# Patient Record
Sex: FEMALE | Race: WHITE | NOT HISPANIC OR LATINO
[De-identification: names, ages, dates, MRNs, and addresses within clinical notes are randomized per-mention and may not be internally consistent; named-entity substitution may affect disease eponyms.]

---

## 2023-05-16 PROBLEM — Z00.00 ENCOUNTER FOR PREVENTIVE HEALTH EXAMINATION: Status: ACTIVE | Noted: 2023-05-16

## 2023-05-23 ENCOUNTER — APPOINTMENT (OUTPATIENT)
Dept: UROLOGY | Facility: CLINIC | Age: 53
End: 2023-05-23
Payer: MEDICAID

## 2023-05-23 VITALS
WEIGHT: 180 LBS | RESPIRATION RATE: 17 BRPM | BODY MASS INDEX: 31.89 KG/M2 | TEMPERATURE: 98.4 F | HEIGHT: 63 IN | SYSTOLIC BLOOD PRESSURE: 108 MMHG | HEART RATE: 93 BPM | DIASTOLIC BLOOD PRESSURE: 72 MMHG

## 2023-05-23 PROCEDURE — 99203 OFFICE O/P NEW LOW 30 MIN: CPT

## 2023-05-23 NOTE — HISTORY OF PRESENT ILLNESS
[FreeTextEntry1] : Patient is a 52 year old female who was found to have renal mass in the setting of work up for GI upset. She never had gross hematuria but not sure because she was having her period.\par \par \par PMH : None \par PSH : Cyst removed from breath as a teenager\par FH : Father had Multiple myeloma \par Social : Here with her  , has six children

## 2023-05-23 NOTE — PHYSICAL EXAM
[General Appearance - Well Developed] : well developed [General Appearance - Well Nourished] : well nourished [] : no respiratory distress [Normal Station and Gait] : the gait and station were normal for the patient's age [No Focal Deficits] : no focal deficits [Oriented To Time, Place, And Person] : oriented to person, place, and time [Affect] : the affect was normal [Not Anxious] : not anxious

## 2023-05-23 NOTE — REVIEW OF SYSTEMS
[Fever] : fever [Chills] : chills [Feeling Tired] : feeling tired [Sore Throat] : sore throat [Vomiting] : vomiting [Constipation] : constipation [Diarrhea] : diarrhea [Heartburn] : heartburn [see HPI] : see HPI [Wake up at night to urinate  How many times?  ___] : wakes up to urinate [unfilled] times during the night [Strong urge to urinate] : strong urge to urinate [Swollen Glands] : swollen glands [Negative] : Endocrine

## 2023-05-23 NOTE — ASSESSMENT
[FreeTextEntry1] : Patient is a 52 year old female presents for evaluation of right renal mass incidentally found during work up of GI upset. \par \par renal mass\par We reviewed the images and reports . We reviewed the possibe underlying histology of solid enhancing renal masses . We discussed the risk of malignancy  vs benign . We discussed the possibility/ role of percutaneous biopsy ,with the associated risks, benefits , complications and accuracy issues ( risk of false negative). \par The natural history and biology of renal cell carcinoma was discussed. \par Options were reviewed including ,not limited to,active surveillance, surgical extirpation and ablation . The risk of tumor growth and metastasis on active surveillance were reviewed .\par Options were reviewed including , not limited to,active surveillance ( AS ) surgical extirpation and ablation. The risk of tumor growth and metastasis on AS could mean missing the opportunity for cure was discussed \par With respect to treatment we reviewed  ablation ( cryoablation, radiofrequency ablation) risks of recurrence,opportunities for salvage treatment and imaging requirements for follow up based on he pathological findings . Oncologic outcomes for ablation were reviewed .\par With respect for surgery we reviewed nephron sparing surgery vs radical nephrectomy  as well as minimally invasive laparoscopic  approach surgery and the possibility of converting to an open procedure . \par Risks of surgical complications were reviewed, including but not limited to : bleeding/ life threatening hemorrhage, vascular/bowel/adjacent visceral organ injury,trocar access injury, the possibility of recognized vs unrecognized delayed recognition injury, risks of thermal /blunt/sharp/retraction injury. We reviewed the risk of DVT, PE, MI ,death,risks of cardiopulmonary/anesthesia related complications,positional injury,infection/collection/abscess,wound complications/dehiscence,urinoma/fistula, ureteral injury/obstruction as well as other complications \par Written materials regarding preop clear liquid diet and bowel prep were given to patient . \par Booking form entered\par

## 2023-06-15 ENCOUNTER — OUTPATIENT (OUTPATIENT)
Dept: OUTPATIENT SERVICES | Facility: HOSPITAL | Age: 53
LOS: 1 days | End: 2023-06-15

## 2023-06-15 VITALS
TEMPERATURE: 98 F | HEIGHT: 63 IN | SYSTOLIC BLOOD PRESSURE: 98 MMHG | HEART RATE: 82 BPM | RESPIRATION RATE: 14 BRPM | WEIGHT: 179.9 LBS | OXYGEN SATURATION: 99 % | DIASTOLIC BLOOD PRESSURE: 65 MMHG

## 2023-06-15 DIAGNOSIS — N28.89 OTHER SPECIFIED DISORDERS OF KIDNEY AND URETER: ICD-10-CM

## 2023-06-15 DIAGNOSIS — N60.02 SOLITARY CYST OF LEFT BREAST: Chronic | ICD-10-CM

## 2023-06-15 LAB
ALBUMIN SERPL ELPH-MCNC: 3.9 G/DL — SIGNIFICANT CHANGE UP (ref 3.3–5)
ALP SERPL-CCNC: 54 U/L — SIGNIFICANT CHANGE UP (ref 40–120)
ALT FLD-CCNC: 11 U/L — SIGNIFICANT CHANGE UP (ref 4–33)
ANION GAP SERPL CALC-SCNC: 14 MMOL/L — SIGNIFICANT CHANGE UP (ref 7–14)
AST SERPL-CCNC: 16 U/L — SIGNIFICANT CHANGE UP (ref 4–32)
BILIRUB SERPL-MCNC: <0.2 MG/DL — SIGNIFICANT CHANGE UP (ref 0.2–1.2)
BLD GP AB SCN SERPL QL: NEGATIVE — SIGNIFICANT CHANGE UP
BUN SERPL-MCNC: 9 MG/DL — SIGNIFICANT CHANGE UP (ref 7–23)
CALCIUM SERPL-MCNC: 9 MG/DL — SIGNIFICANT CHANGE UP (ref 8.4–10.5)
CHLORIDE SERPL-SCNC: 102 MMOL/L — SIGNIFICANT CHANGE UP (ref 98–107)
CO2 SERPL-SCNC: 22 MMOL/L — SIGNIFICANT CHANGE UP (ref 22–31)
CREAT SERPL-MCNC: 0.67 MG/DL — SIGNIFICANT CHANGE UP (ref 0.5–1.3)
EGFR: 106 ML/MIN/1.73M2 — SIGNIFICANT CHANGE UP
GLUCOSE SERPL-MCNC: 101 MG/DL — HIGH (ref 70–99)
HCG UR QL: NEGATIVE — SIGNIFICANT CHANGE UP
HCT VFR BLD CALC: 38.3 % — SIGNIFICANT CHANGE UP (ref 34.5–45)
HGB BLD-MCNC: 12.6 G/DL — SIGNIFICANT CHANGE UP (ref 11.5–15.5)
MCHC RBC-ENTMCNC: 28.3 PG — SIGNIFICANT CHANGE UP (ref 27–34)
MCHC RBC-ENTMCNC: 32.9 GM/DL — SIGNIFICANT CHANGE UP (ref 32–36)
MCV RBC AUTO: 86.1 FL — SIGNIFICANT CHANGE UP (ref 80–100)
NRBC # BLD: 0 /100 WBCS — SIGNIFICANT CHANGE UP (ref 0–0)
NRBC # FLD: 0 K/UL — SIGNIFICANT CHANGE UP (ref 0–0)
PLATELET # BLD AUTO: 425 K/UL — HIGH (ref 150–400)
POTASSIUM SERPL-MCNC: 3.6 MMOL/L — SIGNIFICANT CHANGE UP (ref 3.5–5.3)
POTASSIUM SERPL-SCNC: 3.6 MMOL/L — SIGNIFICANT CHANGE UP (ref 3.5–5.3)
PROT SERPL-MCNC: 7.1 G/DL — SIGNIFICANT CHANGE UP (ref 6–8.3)
RBC # BLD: 4.45 M/UL — SIGNIFICANT CHANGE UP (ref 3.8–5.2)
RBC # FLD: 14 % — SIGNIFICANT CHANGE UP (ref 10.3–14.5)
RH IG SCN BLD-IMP: POSITIVE — SIGNIFICANT CHANGE UP
SODIUM SERPL-SCNC: 138 MMOL/L — SIGNIFICANT CHANGE UP (ref 135–145)
WBC # BLD: 8.57 K/UL — SIGNIFICANT CHANGE UP (ref 3.8–10.5)
WBC # FLD AUTO: 8.57 K/UL — SIGNIFICANT CHANGE UP (ref 3.8–10.5)

## 2023-06-15 RX ORDER — SODIUM CHLORIDE 9 MG/ML
1000 INJECTION, SOLUTION INTRAVENOUS
Refills: 0 | Status: DISCONTINUED | OUTPATIENT
Start: 2023-06-26 | End: 2023-06-27

## 2023-06-15 NOTE — H&P PST ADULT - NSICDXPASTMEDICALHX_GEN_ALL_CORE_FT
PAST MEDICAL HISTORY:  History of hepatitis A     Other specified disorders of kidney and ureter

## 2023-06-15 NOTE — H&P PST ADULT - GASTROINTESTINAL
soft/nontender/normal active bowel sounds/no rigidity/no organomegaly/no palpable melisa/no masses palpable negative details…

## 2023-06-15 NOTE — H&P PST ADULT - HISTORY OF PRESENT ILLNESS
51y/o female scheduled for laparoscopic right partial nephrectomy possible radical, possible open on 6/26/2023.  Pt states, " 53y/o female scheduled for laparoscopic right partial nephrectomy possible radical, possible open on 6/26/2023.  Pt states, "developed abdominal pain, n/v approximately 1 months ago, went to the ER.  Underwent cat scan incidental finding of right kidney mass.  Abdominal pain resolved in 2 days."

## 2023-06-15 NOTE — H&P PST ADULT - PROBLEM SELECTOR PLAN 1
Pt scheduled for laparoscopic right partila Pt scheduled for laparoscopic right partial nephrectomy, possible radical, possible open on 6/26/2023.  labs done results pending, ekg done.  Urine cup provided.  Chlorhexidine provided-  written and verbal instructions given, pt able to verbalize understanding.  Preop teaching done pt able to verbalize understanding.    medication day of procedure- sprintec, pepcid

## 2023-06-15 NOTE — H&P PST ADULT - HISTORY OF COVID-19 VACCINATION
History  Chief Complaint   Patient presents with   • Vertigo - Recurrent     Pt reports vertigo since last Thursday, severe headaches, dizziness, watery eyes; no relief with meclizine and valium       History provided by:  Patient   used: No      60-year-old female presents emergency department for evaluation of headache, vertigo  She has an MRI from 2021 that showed empty sella syndrome and she was told that she may have elevated intracranial pressure in the future  She has no blurry vision  She has no nausea  She has no numbness, weakness or tingling in her extremities  She has no symptoms when at rest, dizziness when she turns her head to the side  She has watery eyes, congestion  Symptoms alleviated when at rest   Headache is 5/10 this time and has been present for several days with no thunderclap features  No fever  No chest pain, shortness of breath, abdominal pain  Prior to Admission Medications   Prescriptions Last Dose Informant Patient Reported? Taking? ASPIRIN 81 PO   Yes No   Sig: Take 81 mg by mouth   meclizine (ANTIVERT) 12 5 MG tablet   No No   Sig: Take 1 tablet (12 5 mg total) by mouth 3 (three) times a day as needed for dizziness for up to 4 days   meclizine (ANTIVERT) 25 mg tablet   Yes No   Sig: Take 25 mg by mouth 3 (three) times a day as needed      Facility-Administered Medications: None       Past Medical History:   Diagnosis Date   • Colitis    • Diverticulitis    • Dizziness    • HL (hearing loss)    • Stroke [facility])     unknown origin of 3 "mini strokes"  2008, 2012, [address] [facility]    • Tinnitus        Past Surgical History:   Procedure Laterality Date   • ADENOIDECTOMY     • CHOLECYSTECTOMY     • COLONOSCOPY     • DILATION AND CURETTAGE OF UTERUS     • ID COLONOSCOPY FLX DX W/COLLJ SPEC WHEN PFRMD N/A 4/16/2018    Procedure: COLONOSCOPY;  Surgeon: Álvaro Ledezma MD;  Location: [facility]  GI [facility];   Service: Gastroenterology   • TONSILLECTOMY Family History   Problem Relation Age of Onset   • Prostate cancer Father 79   • No Known Problems Sister    • No Known Problems Sister    • No Known Problems Daughter    • No Known Problems Daughter    • No Known Problems Maternal Grandmother    • No Known Problems Maternal Grandfather    • No Known Problems Paternal Grandmother    • No Known Problems Paternal Grandfather    • No Known Problems Maternal Aunt    • Breast cancer Paternal Aunt 53   • Colon cancer Paternal [de-identified]   • BRCA1 Positive Cousin    • BRCA1 Positive Cousin    • Colon cancer Paternal Uncle         colon ca     I have reviewed and agree with the history as documented  E-Cigarette/Vaping   • E-Cigarette Use Never User      E-Cigarette/Vaping Substances     Social History     Tobacco Use   • Smoking status: Former   • Smokeless tobacco: Never   Vaping Use   • Vaping Use: Never used   Substance Use Topics   • Alcohol use: No   • Drug use: No       Review of Systems   Neurological: Positive for dizziness  All other systems reviewed and are negative  Physical Exam  Physical Exam  Vitals and nursing note reviewed  Constitutional:       General: She is not in acute distress  Appearance: She is well-developed and well-nourished  HENT:      Head: Normocephalic and atraumatic  Eyes:      Extraocular Movements: EOM normal       Pupils: Pupils are equal, round, and reactive to light  Cardiovascular:      Rate and Rhythm: Normal rate and regular rhythm  Heart sounds: Normal heart sounds  No murmur heard  Pulmonary:      Effort: Pulmonary effort is normal  No respiratory distress  Breath sounds: Normal breath sounds  No wheezing or rales  Abdominal:      General: Bowel sounds are normal  There is no distension  Palpations: Abdomen is soft  Tenderness: There is no abdominal tenderness  There is no guarding or rebound  Musculoskeletal:         General: No deformity or edema  Normal range of motion  Cervical back: Normal range of motion and neck supple  Lymphadenopathy:      Cervical: No cervical adenopathy  Skin:     Capillary Refill: Capillary refill takes less than 2 seconds  Findings: No erythema or rash  Neurological:      Mental Status: She is alert and oriented to person, place, and time  Cranial Nerves: No cranial nerve deficit  Motor: No abnormal muscle tone  Coordination: Coordination normal       Comments: PERRL  No numbness, weakness in her extremities  Able to ambulate  Psychiatric:         Mood and Affect: Mood and affect normal          Behavior: Behavior normal          Vital Signs  ED Triage Vitals [11/18/22 1328]   Temperature Pulse Respirations Blood Pressure SpO2   98 4 °F (36 9 °C) 98 18 (!) 176/83 95 %      Temp Source Heart Rate Source Patient Position - Orthostatic VS BP Location FiO2 (%)   Oral Monitor Sitting Right arm --      Pain Score       No Pain           Vitals:    11/18/22 1328 11/18/22 1554   BP: (!) 176/83 139/71   Pulse: 98 82   Patient Position - Orthostatic VS: Sitting Lying         Visual Acuity      ED Medications  Medications   magnesium sulfate 2 g/50 mL IVPB (premix) 2 g (2 g Intravenous New Bag 11/18/22 1619)   diphenhydrAMINE (BENADRYL) injection 25 mg (25 mg Intravenous Given 11/18/22 1608)   metoclopramide (REGLAN) injection 10 mg (10 mg Intravenous Given 11/18/22 1611)   ketorolac (TORADOL) injection 30 mg (30 mg Intravenous Given 11/18/22 1615)       Diagnostic Studies  Results Reviewed     None                 No orders to display              Procedures  Procedures         ED Course                               SBIRT 22yo+    Flowsheet Row Most Recent Value   SBIRT (25 yo +)    In order to provide better care to our patients, we are screening all of our patients for alcohol and drug use  Would it be okay to ask you these screening questions?  Unable to answer at this time Filed at: 11/18/2022 1330                    [facility]  Number of Diagnoses or Management Options  Dizziness: new and requires workup  Vertigo: new and requires workup  Diagnosis management comments: Nonfocal examination  Symptoms worsened with head movement  No visual deficits  Normal neurologic exam    Discussed with Dr Glenn Aguilera (neurologist) via tiger text  As patient has no red flag symptoms, no indication for lumbar puncture or anything urgent to be done  She was given migraine cocktail per telephone note  She had improvement of her headache  She has adequate supplies of outpatient medications at home  She is in no acute distress at time of discharge home, outpatient PCP and neurology follow-up recommended  Amount and/or Complexity of Data Reviewed  Discuss the patient with other providers: yes    Risk of Complications, Morbidity, and/or Mortality  Presenting problems: high  Diagnostic procedures: moderate  Management options: high    Patient Progress  Patient progress: stable      Disposition  Final diagnoses:   Vertigo   Dizziness     Time reflects when diagnosis was documented in both MDM as applicable and the Disposition within this note     Time User Action Codes Description Comment    11/18/2022  4:47 PM Satira Croft Add [R42] Vertigo     11/18/2022  4:47 PM Satira Croft Add [R42] Dizziness       ED Disposition     ED Disposition   Discharge    Condition   Stable    Date/Time   Fri Nov 18, 2022  4:47 PM    Comment   Heber Lara U  13  discharge to home/self care                 Follow-up Information     Follow up With Specialties Details Why Contact Info Additional Information    [facility] [place] [facility] Call in 1 week As needed, follow-up for vertigo [address] [facility] [address] [facility], [address] [place] [address]    [address] Emergency Department Emergency Medicine Go to  As needed, If symptoms worsen [address] [facility] [address] [facility] Emergency Department, [address], [place], [place], [address]          Patient's Medications   Discharge Prescriptions    No medications on file       No discharge procedures on file      PDMP Review     None          ED Provider  Electronically Signed by           Argelia Amato MD  11/18/22 [phone] No

## 2023-06-18 ENCOUNTER — TRANSCRIPTION ENCOUNTER (OUTPATIENT)
Age: 53
End: 2023-06-18

## 2023-06-25 ENCOUNTER — TRANSCRIPTION ENCOUNTER (OUTPATIENT)
Age: 53
End: 2023-06-25

## 2023-06-26 ENCOUNTER — APPOINTMENT (OUTPATIENT)
Dept: UROLOGY | Facility: HOSPITAL | Age: 53
End: 2023-06-26

## 2023-06-26 ENCOUNTER — RESULT REVIEW (OUTPATIENT)
Age: 53
End: 2023-06-26

## 2023-06-26 ENCOUNTER — OUTPATIENT (OUTPATIENT)
Dept: INPATIENT UNIT | Facility: HOSPITAL | Age: 53
LOS: 1 days | Discharge: HOME | End: 2023-06-26
Payer: MEDICAID

## 2023-06-26 VITALS
HEART RATE: 78 BPM | RESPIRATION RATE: 16 BRPM | SYSTOLIC BLOOD PRESSURE: 104 MMHG | HEIGHT: 63 IN | OXYGEN SATURATION: 100 % | WEIGHT: 179.9 LBS | TEMPERATURE: 98 F | DIASTOLIC BLOOD PRESSURE: 60 MMHG

## 2023-06-26 DIAGNOSIS — N28.89 OTHER SPECIFIED DISORDERS OF KIDNEY AND URETER: ICD-10-CM

## 2023-06-26 DIAGNOSIS — N60.02 SOLITARY CYST OF LEFT BREAST: Chronic | ICD-10-CM

## 2023-06-26 LAB — HCG UR QL: NEGATIVE — SIGNIFICANT CHANGE UP

## 2023-06-26 PROCEDURE — 88307 TISSUE EXAM BY PATHOLOGIST: CPT | Mod: 26

## 2023-06-26 PROCEDURE — 88305 TISSUE EXAM BY PATHOLOGIST: CPT | Mod: 26

## 2023-06-26 DEVICE — CLIP APPLIER COVIDIEN ENDOCLIP III 5MM: Type: IMPLANTABLE DEVICE | Site: RIGHT | Status: FUNCTIONAL

## 2023-06-26 DEVICE — CLIP APPLIER COVIDIEN ENDOCLIP II 10MM MED/LG: Type: IMPLANTABLE DEVICE | Site: RIGHT | Status: FUNCTIONAL

## 2023-06-26 DEVICE — LIGATING CLIPS WECK HEMOLOK POLYMER LARGE (PURPLE) 6: Type: IMPLANTABLE DEVICE | Site: RIGHT | Status: FUNCTIONAL

## 2023-06-26 DEVICE — SURGICEL 2 X 14": Type: IMPLANTABLE DEVICE | Site: RIGHT | Status: FUNCTIONAL

## 2023-06-26 RX ORDER — HEPARIN SODIUM 5000 [USP'U]/ML
5000 INJECTION INTRAVENOUS; SUBCUTANEOUS EVERY 8 HOURS
Refills: 0 | Status: DISCONTINUED | OUTPATIENT
Start: 2023-06-26 | End: 2023-06-27

## 2023-06-26 RX ORDER — HYDROMORPHONE HYDROCHLORIDE 2 MG/ML
0.5 INJECTION INTRAMUSCULAR; INTRAVENOUS; SUBCUTANEOUS
Refills: 0 | Status: DISCONTINUED | OUTPATIENT
Start: 2023-06-26 | End: 2023-06-26

## 2023-06-26 RX ORDER — IBUPROFEN 200 MG
1 TABLET ORAL
Refills: 0 | DISCHARGE

## 2023-06-26 RX ORDER — ONDANSETRON 8 MG/1
4 TABLET, FILM COATED ORAL ONCE
Refills: 0 | Status: DISCONTINUED | OUTPATIENT
Start: 2023-06-26 | End: 2023-06-26

## 2023-06-26 RX ORDER — NORGESTIMATE AND ETHINYL ESTRADIOL 7DAYSX3 LO
1 KIT ORAL
Refills: 0 | DISCHARGE

## 2023-06-26 RX ORDER — OXYCODONE HYDROCHLORIDE 5 MG/1
5 TABLET ORAL EVERY 4 HOURS
Refills: 0 | Status: DISCONTINUED | OUTPATIENT
Start: 2023-06-26 | End: 2023-06-27

## 2023-06-26 RX ORDER — SODIUM CHLORIDE 9 MG/ML
1000 INJECTION, SOLUTION INTRAVENOUS
Refills: 0 | Status: DISCONTINUED | OUTPATIENT
Start: 2023-06-26 | End: 2023-06-27

## 2023-06-26 RX ORDER — ACETAMINOPHEN 500 MG
1000 TABLET ORAL EVERY 6 HOURS
Refills: 0 | Status: COMPLETED | OUTPATIENT
Start: 2023-06-26 | End: 2023-06-27

## 2023-06-26 RX ADMIN — HEPARIN SODIUM 5000 UNIT(S): 5000 INJECTION INTRAVENOUS; SUBCUTANEOUS at 22:10

## 2023-06-26 RX ADMIN — SODIUM CHLORIDE 125 MILLILITER(S): 9 INJECTION, SOLUTION INTRAVENOUS at 22:12

## 2023-06-26 RX ADMIN — Medication 400 MILLIGRAM(S): at 22:09

## 2023-06-26 RX ADMIN — HYDROMORPHONE HYDROCHLORIDE 0.5 MILLIGRAM(S): 2 INJECTION INTRAMUSCULAR; INTRAVENOUS; SUBCUTANEOUS at 17:43

## 2023-06-26 RX ADMIN — HYDROMORPHONE HYDROCHLORIDE 0.5 MILLIGRAM(S): 2 INJECTION INTRAMUSCULAR; INTRAVENOUS; SUBCUTANEOUS at 18:00

## 2023-06-26 RX ADMIN — Medication 1000 MILLIGRAM(S): at 23:12

## 2023-06-26 NOTE — PROGRESS NOTE ADULT - ASSESSMENT
A/P: 52y Female s/p R lap partial NX  DVT prophylaxis/OOB  Incentive spirometry  Strict I&O's  Analgesia and antiemetics as needed  Diet  AM labs

## 2023-06-26 NOTE — PATIENT PROFILE ADULT - FALL HARM RISK - UNIVERSAL INTERVENTIONS
Bed in lowest position, wheels locked, appropriate side rails in place/Call bell, personal items and telephone in reach/Instruct patient to call for assistance before getting out of bed or chair/Non-slip footwear when patient is out of bed/[place] to call system/Physically safe environment - no spills, clutter or unnecessary equipment/Purposeful Proactive Rounding/Room/bathroom lighting operational, light cord in reach

## 2023-06-27 ENCOUNTER — TRANSCRIPTION ENCOUNTER (OUTPATIENT)
Age: 53
End: 2023-06-27

## 2023-06-27 VITALS
OXYGEN SATURATION: 99 % | RESPIRATION RATE: 19 BRPM | HEART RATE: 72 BPM | TEMPERATURE: 98 F | DIASTOLIC BLOOD PRESSURE: 75 MMHG | SYSTOLIC BLOOD PRESSURE: 132 MMHG

## 2023-06-27 LAB
ANION GAP SERPL CALC-SCNC: 8 MMOL/L — SIGNIFICANT CHANGE UP (ref 7–14)
BUN SERPL-MCNC: 8 MG/DL — SIGNIFICANT CHANGE UP (ref 7–23)
CALCIUM SERPL-MCNC: 8.1 MG/DL — LOW (ref 8.4–10.5)
CHLORIDE SERPL-SCNC: 105 MMOL/L — SIGNIFICANT CHANGE UP (ref 98–107)
CO2 SERPL-SCNC: 21 MMOL/L — LOW (ref 22–31)
CREAT FLD-MCNC: 0.88 MG/DL — SIGNIFICANT CHANGE UP
CREAT SERPL-MCNC: 0.84 MG/DL — SIGNIFICANT CHANGE UP (ref 0.5–1.3)
EGFR: 84 ML/MIN/1.73M2 — SIGNIFICANT CHANGE UP
GLUCOSE SERPL-MCNC: 105 MG/DL — HIGH (ref 70–99)
HCT VFR BLD CALC: 35.3 % — SIGNIFICANT CHANGE UP (ref 34.5–45)
HGB BLD-MCNC: 11.8 G/DL — SIGNIFICANT CHANGE UP (ref 11.5–15.5)
MCHC RBC-ENTMCNC: 28.5 PG — SIGNIFICANT CHANGE UP (ref 27–34)
MCHC RBC-ENTMCNC: 33.4 GM/DL — SIGNIFICANT CHANGE UP (ref 32–36)
MCV RBC AUTO: 85.3 FL — SIGNIFICANT CHANGE UP (ref 80–100)
NRBC # BLD: 0 /100 WBCS — SIGNIFICANT CHANGE UP (ref 0–0)
NRBC # FLD: 0 K/UL — SIGNIFICANT CHANGE UP (ref 0–0)
PLATELET # BLD AUTO: 364 K/UL — SIGNIFICANT CHANGE UP (ref 150–400)
POTASSIUM SERPL-MCNC: 3.9 MMOL/L — SIGNIFICANT CHANGE UP (ref 3.5–5.3)
POTASSIUM SERPL-SCNC: 3.9 MMOL/L — SIGNIFICANT CHANGE UP (ref 3.5–5.3)
RBC # BLD: 4.14 M/UL — SIGNIFICANT CHANGE UP (ref 3.8–5.2)
RBC # FLD: 14.1 % — SIGNIFICANT CHANGE UP (ref 10.3–14.5)
SODIUM SERPL-SCNC: 134 MMOL/L — LOW (ref 135–145)
WBC # BLD: 10.64 K/UL — HIGH (ref 3.8–10.5)
WBC # FLD AUTO: 10.64 K/UL — HIGH (ref 3.8–10.5)

## 2023-06-27 RX ORDER — SIMETHICONE 80 MG/1
80 TABLET, CHEWABLE ORAL ONCE
Refills: 0 | Status: COMPLETED | OUTPATIENT
Start: 2023-06-27 | End: 2023-06-27

## 2023-06-27 RX ORDER — SODIUM CHLORIDE 9 MG/ML
1000 INJECTION, SOLUTION INTRAVENOUS
Refills: 0 | Status: DISCONTINUED | OUTPATIENT
Start: 2023-06-27 | End: 2023-06-27

## 2023-06-27 RX ADMIN — SIMETHICONE 80 MILLIGRAM(S): 80 TABLET, CHEWABLE ORAL at 13:26

## 2023-06-27 RX ADMIN — Medication 1000 MILLIGRAM(S): at 16:27

## 2023-06-27 RX ADMIN — SODIUM CHLORIDE 125 MILLILITER(S): 9 INJECTION, SOLUTION INTRAVENOUS at 05:23

## 2023-06-27 RX ADMIN — Medication 400 MILLIGRAM(S): at 09:59

## 2023-06-27 RX ADMIN — Medication 400 MILLIGRAM(S): at 15:57

## 2023-06-27 RX ADMIN — SODIUM CHLORIDE 75 MILLILITER(S): 9 INJECTION, SOLUTION INTRAVENOUS at 07:56

## 2023-06-27 RX ADMIN — HEPARIN SODIUM 5000 UNIT(S): 5000 INJECTION INTRAVENOUS; SUBCUTANEOUS at 13:08

## 2023-06-27 RX ADMIN — Medication 400 MILLIGRAM(S): at 04:01

## 2023-06-27 RX ADMIN — OXYCODONE HYDROCHLORIDE 5 MILLIGRAM(S): 5 TABLET ORAL at 08:39

## 2023-06-27 RX ADMIN — Medication 1000 MILLIGRAM(S): at 05:15

## 2023-06-27 RX ADMIN — HEPARIN SODIUM 5000 UNIT(S): 5000 INJECTION INTRAVENOUS; SUBCUTANEOUS at 05:21

## 2023-06-27 RX ADMIN — Medication 1000 MILLIGRAM(S): at 10:29

## 2023-06-27 RX ADMIN — Medication 10 MILLIGRAM(S): at 07:52

## 2023-06-27 RX ADMIN — SODIUM CHLORIDE 75 MILLILITER(S): 9 INJECTION, SOLUTION INTRAVENOUS at 10:00

## 2023-06-27 RX ADMIN — OXYCODONE HYDROCHLORIDE 5 MILLIGRAM(S): 5 TABLET ORAL at 08:00

## 2023-06-27 NOTE — PROGRESS NOTE ADULT - SUBJECTIVE AND OBJECTIVE BOX
Subjective  Patient has mild complaints of pain this AM and is otherwise doing well. Patient tolerating CLD without N/V but has not had flatus yet. Denies fevers, chills, nausea, vomiting, SOB, CP. Tolerating diet.    Objective    Vital signs  T(F): , Max: 98.3 (06-27-23 @ 01:52)  HR: 76 (06-27-23 @ 05:16)  BP: 121/68 (06-27-23 @ 05:16)  SpO2: 98% (06-27-23 @ 05:16)  Wt(kg): --    Output     OUT:    Bulb (mL): 155 mL    Indwelling Catheter - Urethral (mL): 610 mL    Voided (mL): 300 mL  Total OUT: 1065 mL    Total NET: -1065 mL          Gen: NAD  Abd: soft, nontender, nondistended. Surgical incisions are clean, dry, intact. AZALEA draining serosanguinous output.   : dasilva secured in place, draining CYU    Labs            Urine Cx: ?  Blood Cx: ?    Imaging

## 2023-06-27 NOTE — DISCHARGE NOTE PROVIDER - HOSPITAL COURSE
Pt had lap R. partial nephrectomy yesterday; did well; ambulating; voiding; passed gas and ladi reg diet; pain controlled; labs stable; AZALEA removed; home today.

## 2023-06-27 NOTE — PROGRESS NOTE ADULT - ASSESSMENT
POD1 from right partial nephrectomy    Plan  - AM labs  - CLD/mIVF  - Marie removed on AM rounds -> f/u TOV  - AZALEA Cr sent, f/u ZAALEA Vizcaino  - Dulcolax in AM  - OOB  - F/u GI function for diet

## 2023-06-27 NOTE — DISCHARGE NOTE NURSING/CASE MANAGEMENT/SOCIAL WORK - NSDCPNINST_GEN_ALL_CORE
Notify   Notify Dr Ko if you experience any increase in pain not relieved with medication, any redness, drainage or swelling around incision, any persistent nausea vomiting  or any fever >100.5.  Drink plenty of fluids.  No heavy lifting or straining.  Use over the counter stool softeners to assist with constipation.

## 2023-06-27 NOTE — DISCHARGE NOTE PROVIDER - CARE PROVIDER_API CALL
Rohan Ko  Urology  [address] [place] [address], [address] [address]  Phone: [phone]  Fax: [phone]  Follow Up Time:

## 2023-06-27 NOTE — DISCHARGE NOTE PROVIDER - NSDCACTIVITY_GEN_ALL_CORE
Showering allowed/Stairs allowed/Walking - Indoors allowed/No heavy lifting/straining/Walking - Outdoors allowed Showering allowed/Stairs allowed/Driving allowed/Walking - Indoors allowed/No heavy lifting/straining/Walking - Outdoors allowed

## 2023-06-27 NOTE — DISCHARGE NOTE PROVIDER - NSDCMRMEDTOKEN_GEN_ALL_CORE_FT
Motrin 400 mg oral tablet: 1 tab(s) orally every 8 hours  Sprintec 0.25 mg-35 mcg oral tablet: 1 tab(s) orally once a day

## 2023-06-27 NOTE — DISCHARGE NOTE NURSING/CASE MANAGEMENT/SOCIAL WORK - PATIENT PORTAL LINK FT
You can access the FollowMyHealth Patient Portal offered by [facility] by registering at the following website: [email or web address]. By joining [email or web address]’s FollowMyHealth portal, you will also be able to view your health information using other applications (apps) compatible with our system.

## 2023-06-27 NOTE — DISCHARGE NOTE PROVIDER - NSDCCPCAREPLAN_GEN_ALL_CORE_FT
PRINCIPAL DISCHARGE DIAGNOSIS  Diagnosis: Other specified disorders of kidney and ureter  Assessment and Plan of Treatment: Drink plenty of fluids.  No heavy lifting (greater than 10 pounds) or straining for 4 to 6 weeks.  You may shower, just pat white strips dry, they will fall off in a few weeks. Change dressing at drain site daily or as needed until dry.   's  office will call  to schedule a follow up appointment.  Call the office if you have fever greater than 101, difficulty urinating, pain not relieved with pain medication, nausea/vomiting..        SECONDARY DISCHARGE DIAGNOSES  Diagnosis: Other specified disorders of kidney and ureter  Assessment and Plan of Treatment:

## 2023-06-29 PROBLEM — Z86.19 PERSONAL HISTORY OF OTHER INFECTIOUS AND PARASITIC DISEASES: Chronic | Status: ACTIVE | Noted: 2023-06-15

## 2023-07-03 ENCOUNTER — APPOINTMENT (OUTPATIENT)
Dept: UROLOGY | Facility: CLINIC | Age: 53
End: 2023-07-03
Payer: MEDICAID

## 2023-07-03 VITALS
RESPIRATION RATE: 17 BRPM | HEIGHT: 63 IN | BODY MASS INDEX: 31.89 KG/M2 | SYSTOLIC BLOOD PRESSURE: 124 MMHG | HEART RATE: 90 BPM | DIASTOLIC BLOOD PRESSURE: 80 MMHG | TEMPERATURE: 98.5 F | WEIGHT: 180 LBS

## 2023-07-03 DIAGNOSIS — R23.8 OTHER SKIN CHANGES: ICD-10-CM

## 2023-07-03 DIAGNOSIS — N28.89 OTHER SPECIFIED DISORDERS OF KIDNEY AND URETER: ICD-10-CM

## 2023-07-03 LAB — SURGICAL PATHOLOGY STUDY: SIGNIFICANT CHANGE UP

## 2023-07-03 PROCEDURE — 99024 POSTOP FOLLOW-UP VISIT: CPT

## 2023-07-03 RX ORDER — METHYLPREDNISOLONE 4 MG/1
4 TABLET ORAL
Qty: 1 | Refills: 0 | Status: ACTIVE | COMMUNITY
Start: 2023-07-03 | End: 1900-01-01

## 2023-07-03 RX ORDER — HYDROCORTISONE 25 MG/G
2.5 CREAM TOPICAL
Qty: 1 | Refills: 0 | Status: ACTIVE | COMMUNITY
Start: 2023-07-03 | End: 1900-01-01

## 2023-07-03 RX ORDER — CLINDAMYCIN HYDROCHLORIDE 150 MG/1
150 CAPSULE ORAL EVERY 6 HOURS
Qty: 16 | Refills: 0 | Status: ACTIVE | COMMUNITY
Start: 2023-07-03 | End: 1900-01-01

## 2023-07-03 RX ORDER — HYDROXYZINE HYDROCHLORIDE 10 MG/1
10 TABLET ORAL TWICE DAILY
Qty: 20 | Refills: 1 | Status: ACTIVE | COMMUNITY
Start: 2023-07-03 | End: 1900-01-01

## 2023-07-05 ENCOUNTER — EMERGENCY (EMERGENCY)
Facility: HOSPITAL | Age: 53
LOS: 1 days | Discharge: HOME | End: 2023-07-05
Attending: EMERGENCY MEDICINE | Admitting: EMERGENCY MEDICINE
Payer: MEDICAID

## 2023-07-05 VITALS
HEART RATE: 98 BPM | SYSTOLIC BLOOD PRESSURE: 150 MMHG | RESPIRATION RATE: 16 BRPM | HEIGHT: 63 IN | DIASTOLIC BLOOD PRESSURE: 77 MMHG | TEMPERATURE: 98 F | OXYGEN SATURATION: 97 %

## 2023-07-05 DIAGNOSIS — N60.02 SOLITARY CYST OF LEFT BREAST: Chronic | ICD-10-CM

## 2023-07-05 PROCEDURE — 99284 EMERGENCY DEPT VISIT MOD MDM: CPT

## 2023-07-05 RX ORDER — FLUCONAZOLE 150 MG/1
1 TABLET ORAL
Qty: 14 | Refills: 0
Start: 2023-07-05 | End: 2023-07-11

## 2023-07-05 RX ORDER — FLUCONAZOLE 150 MG/1
150 TABLET ORAL ONCE
Refills: 0 | Status: COMPLETED | OUTPATIENT
Start: 2023-07-05 | End: 2023-07-05

## 2023-07-05 RX ADMIN — FLUCONAZOLE 150 MILLIGRAM(S): 150 TABLET ORAL at 17:36

## 2023-07-05 NOTE — ED PROVIDER NOTE - ATTENDING CONTRIBUTION TO CARE
I performed a face-to-face evaluation of the patient and performed a history and physical examination. I agree with the history and physical examination. If this was a PA visit, I personally saw the patient with the PA and performed a substantive portion of the visit including all aspects of the medical decision making.    Recent partial nephrectomy. F/b ~1 wk red, expanding rash. No vesicles. No F. Not hot. Did not respond fully to steroids, H1 blocker, Clinda. Rash mostly in areas that were moist/dark (under bandages and breasts). Treat for fungal infection.

## 2023-07-05 NOTE — ED PROVIDER NOTE - CLINICAL SUMMARY MEDICAL DECISION MAKING FREE TEXT BOX
Silvia: Recent partial nephrectomy. F/b ~1 wk red, expanding rash. No vesicles. No F. Not hot. Did not respond fully to steroids, H1 blocker, Clinda. Rash mostly in areas that were moist/dark (under bandages and breasts). Treat for fungal infection.

## 2023-07-05 NOTE — ED PROVIDER NOTE - NSFOLLOWUPINSTRUCTIONS_ED_ALL_ED_FT
A prescription has been sent to your pharmacy for antifungal medication please follow the prescription instructions on the bottle    Please return to the emergency department if your symptoms persist or worsen    Follow-up with your primary care physician.        Skin Yeast Infection    WHAT YOU NEED TO KNOW:    Yeast is normally present on the skin. Infection happens when you have too much yeast, or when it gets into a cut on your skin. Certain types of mold and fungus can cause a yeast infection. A skin yeast infection can appear anywhere on your skin or nail beds. Skin yeast infections are usually found on warm, moist parts of the body. Examples include between skin folds or under the breasts.    DISCHARGE INSTRUCTIONS:    Return to the emergency department if:    You have signs of infection, such as pus, warmth or red streaks coming from the wound, or a fever.    Call your doctor if:    Your symptoms worsen or do not get better within 7 to 10 days.    You have new or returning signs of a skin yeast infection after treatment.    You have questions or concerns about your condition or care.  Medicines:    Antifungal medicine may be given as a cream, ointment, or pill.    Take your medicine as directed. Contact your healthcare provider if you think your medicine is not helping or if you have side effects. Tell your provider if you are allergic to any medicine. Keep a list of the medicines, vitamins, and herbs you take. Include the amounts, and when and why you take them. Bring the list or the pill bottles to follow-up visits. Carry your medicine list with you in case of an emergency.  Care for the skin near the infection: You may only have discolored patches of skin, or areas that are dry and flaking. Care for these skin problems as directed by your healthcare provider. If you have painful skin or an open sore, you will need to protect the skin and prevent damage. You will also need to keep the skin dry as much as possible. Ask your healthcare provider how to care for your skin while the infection clears. The following are general guidelines for caring for painful or open skin:    Keep the skin clean. Ask your healthcare provider if you should wash with mild soap and water. Do not use soap that contains alcohol. Alcohol can dry and irritate the skin and make symptoms worse. Your baby's healthcare provider may tell you to use diaper cream or ointment when you change his or her diaper. This will protect the skin and prevent moisture from collecting.    Keep the skin dry. Pat the area dry with a towel. Do not rub, because this may irritate the skin. If you have a skin yeast infection between skin folds, lift the top part gently and hold it while you dry between your skin folds. Always dry your feet completely after you swim or bathe, including between your toes. Dry your skin if you are sweating from exercise or exposure to heat. Use a clean towel each time to prevent spreading or continuing the infection.    Keep the skin protected. Ask your healthcare provider if you should cover the area with a bandage or leave it open. Check your skin each day to make sure you do not have new or worsening problems. You may need to have someone check the skin if you cannot see the area easily.  Prevent another skin yeast infection:    Do not share clothing or towels    Wear shower shoes if you need to use a public shower    Dry your feet completely after you bathe, and apply antifungal powder or cream as directed    Put on socks before you get dressed so you do not spread fungus from your feet    Wear light clothing that allows air to get to your skin    Manage your weight to prevent skin folds where yeast can collect    Manage diabetes    Use antibiotics correctly to prevent antibiotic resistance    Change your baby's diaper often, and keep the area clean and dry as much as possible    Use a diaper cream or ointment that contains zinc oxide or dimethicone on your baby's diaper area as directed  Follow up with your doctor as directed: Write down your questions so you remember to ask them during your visits.

## 2023-07-05 NOTE — ED ADULT TRIAGE NOTE - CHIEF COMPLAINT QUOTE
Pt is s/p partial nephrectomy on 6/26, c/o rash to abdomen. Pt states she was sent by her MD for IV antihistamines. Denies fevers, denies pruritus now, but states the rash was itchy before.

## 2023-07-05 NOTE — ED PROVIDER NOTE - PATIENT PORTAL LINK FT
You can access the FollowMyHealth Patient Portal offered by [facility] by registering at the following website: [email or web address]. By joining "[employer]"’s FollowMyHealth portal, you will also be able to view your health information using other applications (apps) compatible with our system.

## 2023-07-05 NOTE — ED PROVIDER NOTE - OBJECTIVE STATEMENT
Three 2-year-old female presenting with a rash.  Patient notes that she is 1 week postop from a nephrectomy.  Patient was seen by her surgeon's PA recently who prescribed a course of steroids and antihistamines–the rash has since worsened.  Patient denies any fever, endorses itchiness.  Rash is not painful.

## 2023-07-05 NOTE — ED PROVIDER NOTE - PHYSICAL EXAMINATION
PHYSICAL EXAM:  CONSTITUTIONAL: Well appearing, awake, alert, oriented to person, place, time/situation and in no apparent distress.   CARDIAC: Normal rate,   RESPIRATORY: Breathing unlabored.   ABDOMEN: Soft,ntnd, erythematous rash under breasts, suprapubic area,   SKIN:  erythematous rash under breasts, suprapubic area, blanching candidal rash in appearance

## 2023-07-12 ENCOUNTER — APPOINTMENT (OUTPATIENT)
Dept: UROLOGY | Facility: CLINIC | Age: 53
End: 2023-07-12

## 2023-07-13 PROBLEM — N28.89 RENAL MASS: Status: ACTIVE | Noted: 2023-05-23

## 2023-07-13 NOTE — PHYSICAL EXAM
[General Appearance - Well Nourished] : well nourished [Abdomen Soft] : soft [FreeTextEntry1] : generalized rash . Raised follicular rash , itchy

## 2023-07-13 NOTE — HISTORY OF PRESENT ILLNESS
[FreeTextEntry1] : Partial nephrectomy on June 26 called today with a rash all over her body especially near the incision sites

## 2023-07-13 NOTE — ASSESSMENT
[FreeTextEntry1] : Seen with a rash from under breast to thighs No new medication rash is raised , red itchy No drainage Will start Medrol dose pack , clindaymycin and hydrocortisone cream

## 2023-08-09 ENCOUNTER — LABORATORY RESULT (OUTPATIENT)
Age: 53
End: 2023-08-09

## 2023-08-09 ENCOUNTER — APPOINTMENT (OUTPATIENT)
Dept: UROLOGY | Facility: CLINIC | Age: 53
End: 2023-08-09
Payer: MEDICAID

## 2023-08-09 VITALS
RESPIRATION RATE: 16 BRPM | DIASTOLIC BLOOD PRESSURE: 75 MMHG | HEART RATE: 85 BPM | SYSTOLIC BLOOD PRESSURE: 117 MMHG | OXYGEN SATURATION: 96 % | TEMPERATURE: 97.5 F

## 2023-08-09 PROCEDURE — 99024 POSTOP FOLLOW-UP VISIT: CPT

## 2023-08-09 NOTE — PHYSICAL EXAM
[General Appearance - Well Developed] : well developed [General Appearance - Well Nourished] : well nourished [Abdomen Soft] : soft [FreeTextEntry1] : wounds healing well [Skin Color & Pigmentation] : normal skin color and pigmentation [Skin Turgor] : supple [Heart Rate And Rhythm] : Heart rate and rhythm were normal

## 2023-08-09 NOTE — ASSESSMENT
[FreeTextEntry1] : She is doing well . She doesnt sleep well at night but that exsisted prior to surgery . She is hydrating well . C/O sediment in her urine . Will send off culture and urinalysis .  She is aware to hydrate and this is causing frequent urination I spoke with patient regarding test results . patient counseled to   Risks and expected outcomes discussed Patient was counseled . Patient promised to contact office with follow up as directed .[He/She[ will contact us with any questions or concernsFollow up in 6 months with renal ultrasound

## 2023-08-11 LAB — BACTERIA UR CULT: NORMAL

## 2023-11-08 LAB
APPEARANCE: CLEAR
BILIRUBIN URINE: NEGATIVE
BLOOD URINE: ABNORMAL
COLOR: YELLOW
GLUCOSE QUALITATIVE U: NEGATIVE MG/DL
KETONES URINE: NEGATIVE MG/DL
LEUKOCYTE ESTERASE URINE: NEGATIVE
NITRITE URINE: NEGATIVE
PH URINE: 5.5
PROTEIN URINE: NORMAL MG/DL
SPECIFIC GRAVITY URINE: 1.02
UROBILINOGEN URINE: 0.2 MG/DL

## 2023-12-19 NOTE — PATIENT PROFILE ADULT - FUNCTIONAL ASSESSMENT - BASIC MOBILITY ASSESSMENT TYPE
well developed, well nourished , in no acute distress , ambulating without difficulty , normal communication ability
Admission

## 2024-03-14 ENCOUNTER — APPOINTMENT (OUTPATIENT)
Dept: UROLOGY | Facility: CLINIC | Age: 54
End: 2024-03-14
Payer: MEDICAID

## 2024-03-14 ENCOUNTER — OUTPATIENT (OUTPATIENT)
Dept: OUTPATIENT SERVICES | Facility: HOSPITAL | Age: 54
LOS: 1 days | End: 2024-03-14
Payer: MEDICAID

## 2024-03-14 VITALS
TEMPERATURE: 98.5 F | BODY MASS INDEX: 31.89 KG/M2 | DIASTOLIC BLOOD PRESSURE: 66 MMHG | HEART RATE: 93 BPM | SYSTOLIC BLOOD PRESSURE: 117 MMHG | WEIGHT: 180 LBS | HEIGHT: 63 IN | RESPIRATION RATE: 17 BRPM

## 2024-03-14 DIAGNOSIS — N60.02 SOLITARY CYST OF LEFT BREAST: Chronic | ICD-10-CM

## 2024-03-14 DIAGNOSIS — R35.0 FREQUENCY OF MICTURITION: ICD-10-CM

## 2024-03-14 PROCEDURE — 76775 US EXAM ABDO BACK WALL LIM: CPT | Mod: 26

## 2024-03-14 PROCEDURE — 99214 OFFICE O/P EST MOD 30 MIN: CPT

## 2024-03-14 PROCEDURE — 76775 US EXAM ABDO BACK WALL LIM: CPT

## 2024-03-14 NOTE — HISTORY OF PRESENT ILLNESS
[FreeTextEntry1] : Renal mass  noted requiring surgical intervention  Right partial nephrectomy : pT1a  FG 2

## 2024-03-14 NOTE — PHYSICAL EXAM
[Normal Appearance] : normal appearance [Well Groomed] : well groomed [General Appearance - In No Acute Distress] : no acute distress [Edema] : no peripheral edema [Exaggerated Use Of Accessory Muscles For Inspiration] : no accessory muscle use [Respiration, Rhythm And Depth] : normal respiratory rhythm and effort [Abdomen Soft] : soft [Abdomen Tenderness] : non-tender [Costovertebral Angle Tenderness] : no ~M costovertebral angle tenderness [Urinary Bladder Findings] : the bladder was normal on palpation [Normal Station and Gait] : the gait and station were normal for the patient's age [] : no rash [No Focal Deficits] : no focal deficits [Oriented To Time, Place, And Person] : oriented to person, place, and time [Affect] : the affect was normal [Mood] : the mood was normal [No Palpable Adenopathy] : no palpable adenopathy

## 2024-03-14 NOTE — ASSESSMENT
[FreeTextEntry1] : Renal ultrasound done today No evidence of recurrence .  She is doing well  No complaints  Follow up in 1 year with ultrasound

## 2024-03-15 DIAGNOSIS — C65.1 MALIGNANT NEOPLASM OF RIGHT RENAL PELVIS: ICD-10-CM

## 2024-09-12 NOTE — PROGRESS NOTE ADULT - SUBJECTIVE AND OBJECTIVE BOX
Post op Check    Pt seen and examined c/o mild pain on the right side. Denies SOB/CP/N/V.     Vital Signs Last 24 Hrs  T(C): 36.4 (26 Jun 2023 19:00), Max: 36.5 (26 Jun 2023 12:59)  T(F): 97.6 (26 Jun 2023 19:00), Max: 97.7 (26 Jun 2023 12:59)  HR: 77 (26 Jun 2023 19:00) (75 - 85)  BP: 107/60 (26 Jun 2023 19:00) (97/59 - 115/57)  BP(mean): 70 (26 Jun 2023 19:00) (65 - 73)  RR: 14 (26 Jun 2023 19:00) (12 - 20)  SpO2: 100% (26 Jun 2023 19:00) (95% - 100%)    Parameters below as of 26 Jun 2023 17:30  Patient On (Oxygen Delivery Method): room air        I&O's Summary    26 Jun 2023 07:01  -  26 Jun 2023 19:49  --------------------------------------------------------  IN: 455 mL / OUT: 235 mL / NET: 220 mL        Physical Exam  Gen: NAD  Pulm: No respiratory distress, no subcostal retractions  CV: RRR  Abd: Soft, appropriately tender, ND, + right sided AZALEA serosanguinous  : + vidya                   n/a

## 2024-09-12 NOTE — H&P PST ADULT - NSANTHTOTALSCORECAL_ENT_A_CORE
Insight Oriented Therapy. 50 min.  BP1.  We spoke about her workload being steady, but lower than her bills require.  We spoke about some ways to increase census while being patient with the process.  We spoke about weekend planning.  Talked about how to cope with difficult patients.     Chief complaint - Phase of life   Treatment plan - Insight and action consistent with ACT therapy. Providing support, safe space to process their thoughts and feelings, developing therapeutic relationship.  Goals - Self-care, insight, coping skills  Prognosis - Average  Progress - Average  Functional status - 70/100  Focused mental status: Patient was oriented to person, place, time and context  Focused mental exam - alert and oriented  Frequency - Every two weeks    2

## 2024-11-18 NOTE — DISCHARGE NOTE NURSING/CASE MANAGEMENT/SOCIAL WORK - NSTRANSFERBELONGINGSRESP_GEN_A_NUR
Pt arrives with c/o cough began a week ago Monday that is getting worse. Pt took a covid test at home that was negative; concerned for pneumonia.   
yes

## 2025-03-18 ENCOUNTER — APPOINTMENT (OUTPATIENT)
Dept: UROLOGY | Facility: CLINIC | Age: 55
End: 2025-03-18

## 2025-05-22 ENCOUNTER — APPOINTMENT (OUTPATIENT)
Dept: UROLOGY | Facility: CLINIC | Age: 55
End: 2025-05-22
Payer: MEDICAID

## 2025-05-22 ENCOUNTER — NON-APPOINTMENT (OUTPATIENT)
Age: 55
End: 2025-05-22

## 2025-05-22 ENCOUNTER — OUTPATIENT (OUTPATIENT)
Dept: OUTPATIENT SERVICES | Facility: HOSPITAL | Age: 55
LOS: 1 days | End: 2025-05-22
Payer: MEDICAID

## 2025-05-22 VITALS
HEART RATE: 89 BPM | WEIGHT: 183 LBS | SYSTOLIC BLOOD PRESSURE: 128 MMHG | BODY MASS INDEX: 32.43 KG/M2 | HEIGHT: 63 IN | TEMPERATURE: 97.9 F | RESPIRATION RATE: 17 BRPM | DIASTOLIC BLOOD PRESSURE: 83 MMHG

## 2025-05-22 DIAGNOSIS — R35.0 FREQUENCY OF MICTURITION: ICD-10-CM

## 2025-05-22 DIAGNOSIS — C64.9 MALIGNANT NEOPLASM OF UNSPECIFIED KIDNEY, EXCEPT RENAL PELVIS: ICD-10-CM

## 2025-05-22 DIAGNOSIS — N60.02 SOLITARY CYST OF LEFT BREAST: Chronic | ICD-10-CM

## 2025-05-22 PROCEDURE — 99214 OFFICE O/P EST MOD 30 MIN: CPT

## 2025-05-22 PROCEDURE — 76775 US EXAM ABDO BACK WALL LIM: CPT | Mod: 26

## 2025-05-22 PROCEDURE — 76775 US EXAM ABDO BACK WALL LIM: CPT

## 2025-05-27 DIAGNOSIS — C64.9 MALIGNANT NEOPLASM OF UNSPECIFIED KIDNEY, EXCEPT RENAL PELVIS: ICD-10-CM

## (undated) DEVICE — BASIN SET SINGLE

## (undated) DEVICE — GLV 7.5 PROTEXIS (WHITE)

## (undated) DEVICE — SHEARS COVIDIEN ENDO SHEAR 5MM X 45CM LONG W MONOPOLAR CAUTERY

## (undated) DEVICE — TROCAR COVIDIEN VERSAONE BLADELESS FIXATION 12MM STANDARD

## (undated) DEVICE — ELCTR HANDSWITCHING 5MM ABC

## (undated) DEVICE — SOL IRR POUR H2O 500ML

## (undated) DEVICE — PACK GENERAL LAPAROSCOPY

## (undated) DEVICE — TROCAR SURGIQUEST AIRSEAL 12MMX100MM

## (undated) DEVICE — LAP PAD 4 X 18"

## (undated) DEVICE — TUBING STRYKEFLOW II SUCTION / IRRIGATOR

## (undated) DEVICE — GLV 7 PROTEXIS (WHITE)

## (undated) DEVICE — DRSG BENZOIN 0.6CC

## (undated) DEVICE — POSITIONER STRAP ARMBOARD VELCRO TS-30

## (undated) DEVICE — ELCTR GROUNDING PAD ADULT CONMED

## (undated) DEVICE — PREP CHLORAPREP HI-LITE ORANGE 26ML

## (undated) DEVICE — TROCAR COVIDIEN VERSAPORT BLADELESS OPTICAL 5MM STANDARD

## (undated) DEVICE — BLADE SURGICAL #15 CARBON

## (undated) DEVICE — SUT BIOSYN 4-0 18" P-12

## (undated) DEVICE — DRSG TEGADERM 4X4.75"

## (undated) DEVICE — TROCAR COVIDIEN VERSAONE BLADED FIXATION 12MM STANDARD

## (undated) DEVICE — SHEARS COVIDIEN ENDO SHEAR 5MM X 31CM W UNIPOLAR CAUTERY

## (undated) DEVICE — DRSG STERISTRIPS 0.5 X 4"

## (undated) DEVICE — CANISTER DISPOSABLE THIN WALL 3000CC

## (undated) DEVICE — TAPE SILK 3"

## (undated) DEVICE — SUT CAPROSYN 4-0 P-12 UNDYED

## (undated) DEVICE — VENODYNE/SCD SLEEVE CALF MEDIUM

## (undated) DEVICE — TROCAR COVIDIEN VERSAONE BLADELESS FIXATION 5MM LONG

## (undated) DEVICE — LIGASURE ATLAS 10MM 37CM

## (undated) DEVICE — FOLEY TRAY 16FR 5CC LF UMETER CLOSED

## (undated) DEVICE — SUT VICRYL 0 36" CT-1 UNDYED

## (undated) DEVICE — RETRACTOR COVIDIEN ENDOPADDLE 12MM DISP

## (undated) DEVICE — ELCTR GROUNDING PAD ADULT COVIDIEN

## (undated) DEVICE — SUT VICRYL 0 27" UR-6

## (undated) DEVICE — LIGASURE BLUNT TIP 37CM

## (undated) DEVICE — INSUFFLATION NDL COVIDIEN SURGINEEDLE VERESS 150MM LONG

## (undated) DEVICE — TUBING AIRSEAL TRI-LUMEN FILTERED

## (undated) DEVICE — SUT VICRYL 2-0 27" UR-6

## (undated) DEVICE — Device

## (undated) DEVICE — SUT VICRYL 1 36" CT-1 UNDYED

## (undated) DEVICE — TROCAR COVIDIEN VERSAONE BLUNT TIP HASSAN 12MM

## (undated) DEVICE — LABELS BLANK W PEN

## (undated) DEVICE — ENDOCATCH 10MM SPECIMEN POUCH

## (undated) DEVICE — DRAIN RESERVOIR FOR JACKSON PRATT 100CC CARDINAL

## (undated) DEVICE — POSITIONER FOAM EGG CRATE ULNAR 2PCS (PINK)

## (undated) DEVICE — SOL IRR BAG H2O 3000ML

## (undated) DEVICE — SUT CLIP LAPRA-TY ABSORBABLE SIZE 0.118 TO 0.12" VIOLET

## (undated) DEVICE — DRSG CURITY GAUZE SPONGE 4 X 4" 12-PLY

## (undated) DEVICE — SUT VICRYL 2-0 27" SH UNDYED

## (undated) DEVICE — GOWN XL

## (undated) DEVICE — DRSG TEGADERM 2.5X3"

## (undated) DEVICE — INSUFFLATION NDL COVIDIEN SURGINEEDLE VERESS 120MM

## (undated) DEVICE — D HELP - CLEARVIEW CLEARIFY SYSTEM

## (undated) DEVICE — SUT VLOC 180 0 12" GS-21 GREEN